# Patient Record
Sex: FEMALE | Race: BLACK OR AFRICAN AMERICAN | NOT HISPANIC OR LATINO | Employment: UNEMPLOYED | URBAN - METROPOLITAN AREA
[De-identification: names, ages, dates, MRNs, and addresses within clinical notes are randomized per-mention and may not be internally consistent; named-entity substitution may affect disease eponyms.]

---

## 2017-01-13 ENCOUNTER — RECORDS - HEALTHEAST (OUTPATIENT)
Dept: LAB | Facility: CLINIC | Age: 47
End: 2017-01-13

## 2017-01-13 LAB
CHOLEST SERPL-MCNC: 188 MG/DL
FASTING STATUS PATIENT QL REPORTED: NORMAL
HDLC SERPL-MCNC: 51 MG/DL
LDLC SERPL CALC-MCNC: 121 MG/DL
TRIGL SERPL-MCNC: 78 MG/DL

## 2018-07-31 ENCOUNTER — RECORDS - HEALTHEAST (OUTPATIENT)
Dept: LAB | Facility: CLINIC | Age: 48
End: 2018-07-31

## 2018-07-31 ENCOUNTER — RECORDS - HEALTHEAST (OUTPATIENT)
Dept: ADMINISTRATIVE | Facility: OTHER | Age: 48
End: 2018-07-31

## 2018-07-31 LAB
ANION GAP SERPL CALCULATED.3IONS-SCNC: 10 MMOL/L (ref 5–18)
BUN SERPL-MCNC: 12 MG/DL (ref 8–22)
CALCIUM SERPL-MCNC: 9.3 MG/DL (ref 8.5–10.5)
CHLORIDE BLD-SCNC: 103 MMOL/L (ref 98–107)
CHOLEST SERPL-MCNC: 205 MG/DL
CO2 SERPL-SCNC: 28 MMOL/L (ref 22–31)
CREAT SERPL-MCNC: 0.64 MG/DL (ref 0.6–1.1)
FASTING STATUS PATIENT QL REPORTED: ABNORMAL
GFR SERPL CREATININE-BSD FRML MDRD: >60 ML/MIN/1.73M2
GLUCOSE BLD-MCNC: 71 MG/DL (ref 70–125)
HDLC SERPL-MCNC: 58 MG/DL
LDLC SERPL CALC-MCNC: 133 MG/DL
POTASSIUM BLD-SCNC: 3.8 MMOL/L (ref 3.5–5)
SODIUM SERPL-SCNC: 141 MMOL/L (ref 136–145)
TRIGL SERPL-MCNC: 71 MG/DL

## 2018-08-01 LAB — 25(OH)D3 SERPL-MCNC: 19.1 NG/ML (ref 30–80)

## 2022-09-30 ENCOUNTER — LAB REQUISITION (OUTPATIENT)
Dept: LAB | Facility: CLINIC | Age: 52
End: 2022-09-30

## 2022-09-30 DIAGNOSIS — Z13.220 ENCOUNTER FOR SCREENING FOR LIPOID DISORDERS: ICD-10-CM

## 2022-09-30 DIAGNOSIS — Z13.1 ENCOUNTER FOR SCREENING FOR DIABETES MELLITUS: ICD-10-CM

## 2022-09-30 LAB
ANION GAP SERPL CALCULATED.3IONS-SCNC: 13 MMOL/L (ref 7–15)
BUN SERPL-MCNC: 12.9 MG/DL (ref 6–20)
CALCIUM SERPL-MCNC: 8.7 MG/DL (ref 8.6–10)
CHLORIDE SERPL-SCNC: 99 MMOL/L (ref 98–107)
CHOLEST SERPL-MCNC: 221 MG/DL
CREAT SERPL-MCNC: 0.57 MG/DL (ref 0.51–0.95)
DEPRECATED HCO3 PLAS-SCNC: 27 MMOL/L (ref 22–29)
GFR SERPL CREATININE-BSD FRML MDRD: >90 ML/MIN/1.73M2
GLUCOSE SERPL-MCNC: 97 MG/DL (ref 70–99)
HDLC SERPL-MCNC: 54 MG/DL
LDLC SERPL CALC-MCNC: 146 MG/DL
NONHDLC SERPL-MCNC: 167 MG/DL
POTASSIUM SERPL-SCNC: 4.5 MMOL/L (ref 3.4–5.3)
SODIUM SERPL-SCNC: 139 MMOL/L (ref 136–145)
TRIGL SERPL-MCNC: 104 MG/DL

## 2022-09-30 PROCEDURE — 80061 LIPID PANEL: CPT | Performed by: PHYSICIAN ASSISTANT

## 2022-09-30 PROCEDURE — 80048 BASIC METABOLIC PNL TOTAL CA: CPT | Performed by: PHYSICIAN ASSISTANT

## 2023-10-16 ENCOUNTER — LAB (OUTPATIENT)
Dept: FAMILY MEDICINE | Facility: CLINIC | Age: 53
End: 2023-10-16

## 2023-10-16 ENCOUNTER — OFFICE VISIT (OUTPATIENT)
Dept: FAMILY MEDICINE | Facility: CLINIC | Age: 53
End: 2023-10-16
Payer: COMMERCIAL

## 2023-10-16 VITALS
TEMPERATURE: 98.4 F | DIASTOLIC BLOOD PRESSURE: 72 MMHG | RESPIRATION RATE: 16 BRPM | BODY MASS INDEX: 34.52 KG/M2 | HEART RATE: 73 BPM | SYSTOLIC BLOOD PRESSURE: 128 MMHG | WEIGHT: 202.2 LBS | HEIGHT: 64 IN | OXYGEN SATURATION: 98 %

## 2023-10-16 DIAGNOSIS — M54.41 CHRONIC BILATERAL LOW BACK PAIN WITH RIGHT-SIDED SCIATICA: ICD-10-CM

## 2023-10-16 DIAGNOSIS — R73.03 PREDIABETES: ICD-10-CM

## 2023-10-16 DIAGNOSIS — Z12.4 CERVICAL CANCER SCREENING: ICD-10-CM

## 2023-10-16 DIAGNOSIS — E53.8 VITAMIN B12 DEFICIENCY (NON ANEMIC): ICD-10-CM

## 2023-10-16 DIAGNOSIS — Z11.4 SCREENING FOR HIV (HUMAN IMMUNODEFICIENCY VIRUS): ICD-10-CM

## 2023-10-16 DIAGNOSIS — Z12.31 VISIT FOR SCREENING MAMMOGRAM: ICD-10-CM

## 2023-10-16 DIAGNOSIS — E55.9 VITAMIN D DEFICIENCY: ICD-10-CM

## 2023-10-16 DIAGNOSIS — Z00.00 ROUTINE GENERAL MEDICAL EXAMINATION AT A HEALTH CARE FACILITY: Primary | ICD-10-CM

## 2023-10-16 DIAGNOSIS — G89.29 CHRONIC BILATERAL LOW BACK PAIN WITH RIGHT-SIDED SCIATICA: ICD-10-CM

## 2023-10-16 DIAGNOSIS — Z11.59 NEED FOR HEPATITIS C SCREENING TEST: ICD-10-CM

## 2023-10-16 DIAGNOSIS — Z12.11 SCREEN FOR COLON CANCER: ICD-10-CM

## 2023-10-16 DIAGNOSIS — Z22.7 LATENT TUBERCULOSIS BY BLOOD TEST: ICD-10-CM

## 2023-10-16 DIAGNOSIS — R53.83 FATIGUE, UNSPECIFIED TYPE: ICD-10-CM

## 2023-10-16 LAB
ALBUMIN SERPL BCG-MCNC: 4.2 G/DL (ref 3.5–5.2)
ALP SERPL-CCNC: 59 U/L (ref 35–104)
ALT SERPL W P-5'-P-CCNC: 15 U/L (ref 0–50)
ANION GAP SERPL CALCULATED.3IONS-SCNC: 12 MMOL/L (ref 7–15)
AST SERPL W P-5'-P-CCNC: 21 U/L (ref 0–45)
BILIRUB SERPL-MCNC: 0.3 MG/DL
BUN SERPL-MCNC: 12.5 MG/DL (ref 6–20)
CALCIUM SERPL-MCNC: 9.2 MG/DL (ref 8.6–10)
CHLORIDE SERPL-SCNC: 102 MMOL/L (ref 98–107)
CHOLEST SERPL-MCNC: 195 MG/DL
CREAT SERPL-MCNC: 0.53 MG/DL (ref 0.51–0.95)
DEPRECATED HCO3 PLAS-SCNC: 25 MMOL/L (ref 22–29)
EGFRCR SERPLBLD CKD-EPI 2021: >90 ML/MIN/1.73M2
ERYTHROCYTE [DISTWIDTH] IN BLOOD BY AUTOMATED COUNT: 13.1 % (ref 10–15)
GLUCOSE SERPL-MCNC: 92 MG/DL (ref 70–99)
HBA1C MFR BLD: 6.3 % (ref 0–5.6)
HCT VFR BLD AUTO: 39.2 % (ref 35–47)
HDLC SERPL-MCNC: 47 MG/DL
HGB BLD-MCNC: 11.9 G/DL (ref 11.7–15.7)
LDLC SERPL CALC-MCNC: 126 MG/DL
MCH RBC QN AUTO: 26.5 PG (ref 26.5–33)
MCHC RBC AUTO-ENTMCNC: 30.4 G/DL (ref 31.5–36.5)
MCV RBC AUTO: 87 FL (ref 78–100)
NONHDLC SERPL-MCNC: 148 MG/DL
PLATELET # BLD AUTO: 293 10E3/UL (ref 150–450)
POTASSIUM SERPL-SCNC: 3.9 MMOL/L (ref 3.4–5.3)
PROT SERPL-MCNC: 7.4 G/DL (ref 6.4–8.3)
RBC # BLD AUTO: 4.49 10E6/UL (ref 3.8–5.2)
SODIUM SERPL-SCNC: 139 MMOL/L (ref 135–145)
TRIGL SERPL-MCNC: 109 MG/DL
TSH SERPL DL<=0.005 MIU/L-ACNC: 0.64 UIU/ML (ref 0.3–4.2)
VIT B12 SERPL-MCNC: 688 PG/ML (ref 232–1245)
VIT D+METAB SERPL-MCNC: 27 NG/ML (ref 20–50)
WBC # BLD AUTO: 6.5 10E3/UL (ref 4–11)

## 2023-10-16 PROCEDURE — 99386 PREV VISIT NEW AGE 40-64: CPT

## 2023-10-16 PROCEDURE — 85027 COMPLETE CBC AUTOMATED: CPT

## 2023-10-16 PROCEDURE — 80053 COMPREHEN METABOLIC PANEL: CPT

## 2023-10-16 PROCEDURE — 86803 HEPATITIS C AB TEST: CPT

## 2023-10-16 PROCEDURE — 80061 LIPID PANEL: CPT

## 2023-10-16 PROCEDURE — 83036 HEMOGLOBIN GLYCOSYLATED A1C: CPT

## 2023-10-16 PROCEDURE — 36415 COLL VENOUS BLD VENIPUNCTURE: CPT

## 2023-10-16 PROCEDURE — 82306 VITAMIN D 25 HYDROXY: CPT

## 2023-10-16 PROCEDURE — 84443 ASSAY THYROID STIM HORMONE: CPT

## 2023-10-16 PROCEDURE — 82607 VITAMIN B-12: CPT

## 2023-10-16 PROCEDURE — 87389 HIV-1 AG W/HIV-1&-2 AB AG IA: CPT

## 2023-10-16 PROCEDURE — 99203 OFFICE O/P NEW LOW 30 MIN: CPT | Mod: 25

## 2023-10-16 RX ORDER — ISONIAZID 300 MG/1
300 TABLET ORAL DAILY
Qty: 90 TABLET | Refills: 0 | Status: SHIPPED | OUTPATIENT
Start: 2023-10-16 | End: 2024-01-14

## 2023-10-16 RX ORDER — TIZANIDINE 2 MG/1
2 TABLET ORAL 2 TIMES DAILY PRN
Qty: 14 TABLET | Refills: 0 | Status: SHIPPED | OUTPATIENT
Start: 2023-10-16

## 2023-10-16 RX ORDER — RIFAMPIN 300 MG/1
600 CAPSULE ORAL DAILY
Qty: 180 CAPSULE | Refills: 0 | Status: SHIPPED | OUTPATIENT
Start: 2023-10-16 | End: 2024-01-14

## 2023-10-16 ASSESSMENT — ENCOUNTER SYMPTOMS
WEAKNESS: 1
NAUSEA: 0
BREAST MASS: 0
DIZZINESS: 0
SHORTNESS OF BREATH: 0
ABDOMINAL PAIN: 0
DIARRHEA: 0
HEMATOCHEZIA: 0
HEARTBURN: 0
PALPITATIONS: 0
FEVER: 0
HEADACHES: 0
CHILLS: 0
JOINT SWELLING: 0
HEMATURIA: 0
NERVOUS/ANXIOUS: 0
PARESTHESIAS: 0
CONSTIPATION: 0
SORE THROAT: 0
EYE PAIN: 0
ARTHRALGIAS: 1
FREQUENCY: 0
MYALGIAS: 0
DYSURIA: 0
COUGH: 0

## 2023-10-16 NOTE — LETTER
"October 24, 2023      Brisa Wilcox [facility]   [place] [address]        Dear ,    We are writing to inform you of your test results.    Brisa,  Your blood counts, electrolytes, kidney function, and liver function labs are all normal.    Your hemoglobin A1c (test for diabetes) is elevated, but you are still in the prediabetes range. We will continue to monitor this. Modifications to your diet and increasing exercise will help.    Cholesterol is elevated and your \"good\" cholesterol is a little low. Work on eating healthy fats (olive oil, avocados, fish) and increasing fiber. Foods like vegetables, fruit, omega-3 fatty acids and organic, lean proteins are important parts of a healthy diet. The more colorful your food is, the healthier it is for you.     Exercise will help with this also. It is recommended that adults exercise at least 150 minutes/week at continuous 10 minutes or more intervals of moderate physical activity. Consider including cardiovascular exercise (walking, running, dancing, etc.), resistance training (use of weights, resistance bands, body weight, etc.) and stretching into your exercise routine.    Please let me know if you have any questions. Take care, DAKOTAH Mendoza CNP          Resulted Orders   HIV Antigen Antibody Combo   Result Value Ref Range    HIV Antigen Antibody Combo Nonreactive Nonreactive      Comment:      HIV-1 p24 Ag & HIV-1/HIV-2 Ab Not Detected   Hepatitis C Screen Reflex to HCV RNA Quant and Genotype   Result Value Ref Range    Hepatitis C Antibody Nonreactive Nonreactive    Narrative    Assay performance characteristics have not been established for newborns, infants, and children.   TSH with free T4 reflex   Result Value Ref Range    TSH 0.64 0.30 - 4.20 uIU/mL   CBC with platelets   Result Value Ref Range    WBC Count 6.5 4.0 - 11.0 10e3/uL    RBC Count 4.49 3.80 - 5.20 10e6/uL    Hemoglobin 11.9 11.7 - 15.7 g/dL    Hematocrit 39.2 35.0 - " 47.0 %    MCV 87 78 - 100 fL    MCH 26.5 26.5 - 33.0 pg    MCHC 30.4 (L) 31.5 - 36.5 g/dL    RDW 13.1 10.0 - 15.0 %    Platelet Count 293 150 - 450 10e3/uL   Comprehensive metabolic panel (BMP + Alb, Alk Phos, ALT, AST, Total. Bili, TP)   Result Value Ref Range    Sodium 139 135 - 145 mmol/L      Comment:      Reference intervals for this test were updated on 09/26/2023 to more accurately reflect our healthy population. There may be differences in the flagging of prior results with similar values performed with this method. Interpretation of those prior results can be made in the context of the updated reference intervals.     Potassium 3.9 3.4 - 5.3 mmol/L    Carbon Dioxide (CO2) 25 22 - 29 mmol/L    Anion Gap 12 7 - 15 mmol/L    Urea Nitrogen 12.5 6.0 - 20.0 mg/dL    Creatinine 0.53 0.51 - 0.95 mg/dL    GFR Estimate >90 >60 mL/min/1.73m2    Calcium 9.2 8.6 - 10.0 mg/dL    Chloride 102 98 - 107 mmol/L    Glucose 92 70 - 99 mg/dL    Alkaline Phosphatase 59 35 - 104 U/L    AST 21 0 - 45 U/L      Comment:      Reference intervals for this test were updated on 6/12/2023 to more accurately reflect our healthy population. There may be differences in the flagging of prior results with similar values performed with this method. Interpretation of those prior results can be made in the context of the updated reference intervals.    ALT 15 0 - 50 U/L      Comment:      Reference intervals for this test were updated on 6/12/2023 to more accurately reflect our healthy population. There may be differences in the flagging of prior results with similar values performed with this method. Interpretation of those prior results can be made in the context of the updated reference intervals.      Protein Total 7.4 6.4 - 8.3 g/dL    Albumin 4.2 3.5 - 5.2 g/dL    Bilirubin Total 0.3 <=1.2 mg/dL   Hemoglobin A1c   Result Value Ref Range    Hemoglobin A1C 6.3 (H) 0.0 - 5.6 %      Comment:      Normal <5.7%   Prediabetes 5.7-6.4%    Diabetes  6.5% or higher     Note: Adopted from ADA consensus guidelines.   Lipid panel reflex to direct LDL Fasting   Result Value Ref Range    Cholesterol 195 <200 mg/dL    Triglycerides 109 <150 mg/dL    Direct Measure HDL 47 (L) >=50 mg/dL    LDL Cholesterol Calculated 126 (H) <=100 mg/dL    Non HDL Cholesterol 148 (H) <130 mg/dL    Narrative    Cholesterol  Desirable:  <200 mg/dL    Triglycerides  Normal:  Less than 150 mg/dL  Borderline High:  150-199 mg/dL  High:  200-499 mg/dL  Very High:  Greater than or equal to 500 mg/dL    Direct Measure HDL  Female:  Greater than or equal to 50 mg/dL   Male:  Greater than or equal to 40 mg/dL    LDL Cholesterol  Desirable:  <100mg/dL  Above Desirable:  100-129 mg/dL   Borderline High:  130-159 mg/dL   High:  160-189 mg/dL   Very High:  >= 190 mg/dL    Non HDL Cholesterol  Desirable:  130 mg/dL  Above Desirable:  130-159 mg/dL  Borderline High:  160-189 mg/dL  High:  190-219 mg/dL  Very High:  Greater than or equal to 220 mg/dL   Vitamin D Deficiency   Result Value Ref Range    Vitamin D, Total (25-Hydroxy) 27 20 - 50 ng/mL      Comment:      optimum levels    Narrative    Season, race, dietary intake, and treatment affect the concentration of 25-hydroxy-Vitamin D. Values may decrease during winter months and increase during summer months.    Vitamin D determination is routinely performed by an immunoassay specific for 25 hydroxyvitamin D3.  If an individual is on vitamin D2(ergocalciferol) supplementation, please specify 25 OH vitamin D2 and D3 level determination by LCMSMS test VITD23.     Vitamin B12   Result Value Ref Range    Vitamin B12 688 232 - 1,245 pg/mL       If you have any questions or concerns, please call the clinic at the number listed above.       Sincerely,      DAKOTAH Mendoza CNP

## 2023-10-16 NOTE — PROGRESS NOTES
Assessment & Plan     1. Routine general medical examination at a health care facility  Preventative exam completed today. Discussed healthy lifestyle recommendations of getting 150 minutes of exercise weekly and eating a healthy diet. Reviewed and updated health maintenance. Labs completed today. Declined immunizations.   - REVIEW OF HEALTH MAINTENANCE PROTOCOL ORDERS  - CBC with platelets  - Comprehensive metabolic panel (BMP + Alb, Alk Phos, ALT, AST, Total. Bili, TP)  - Lipid panel reflex to direct LDL Fasting    2. Chronic bilateral low back pain with right-sided sciatica  Straight leg raises negative. No tenderness over spine. Will try tizanidine 1-2 times daily as needed and PT referral.   - Physical Therapy Referral; Future  - tiZANidine (ZANAFLEX) 2 MG tablet; Take 1 tablet (2 mg) by mouth 2 times daily as needed for muscle spasms  Dispense: 14 tablet; Refill: 0    3. Latent tuberculosis by blood test  Previously declined treatment and would not like to complete. Had chest xray completed that was negative for active disease. No cough, fever, shortness of breath. 3 month course given. Counseled on medications.   - rifampin (RIFADIN) 300 MG capsule; Take 2 capsules (600 mg) by mouth daily for 90 days  Dispense: 180 capsule; Refill: 0  - isoniazid (NYDRAZID) 300 MG tablet; Take 1 tablet (300 mg) by mouth daily for 90 days  Dispense: 90 tablet; Refill: 0    4. Fatigue, unspecified type  Low energy. Will check labs today.   - TSH with free T4 reflex  - CBC with platelets  - Comprehensive metabolic panel (BMP + Alb, Alk Phos, ALT, AST, Total. Bili, TP)    5. Prediabetes  Blood glucose machine broke. Had bee checking her blood sugar once daily and PRN. Last A1c 6.1. Recheck today.   - blood glucose monitoring (NO BRAND SPECIFIED) meter device kit; Use to test blood sugar 1 times daily or as directed.  Dispense: 1 kit; Refill: 0  - Comprehensive metabolic panel (BMP + Alb, Alk Phos, ALT, AST, Total. Bili, TP)  -  Hemoglobin A1c    6. Visit for screening mammogram  - MA SCREENING DIGITAL BILAT - Future  (s+30); Future    7. Screen for colon cancer  - COLOGUARD(EXACT SCIENCES); Future    8. Screening for HIV (human immunodeficiency virus)  - HIV Antigen Antibody Combo    9. Need for hepatitis C screening test  - Hepatitis C Screen Reflex to HCV RNA Quant and Genotype    10. Vitamin D deficiency  - Vitamin D Deficiency    11. Vitamin B12 deficiency (non anemic)  - Vitamin B12    12. Cervical cancer screening  Reports have 3 years ago, no records. She declines today.      DAKOTAH Mendoza CNP  [facility]    Subjective   Brisa is a 53 year old, presenting for the following health issues:  Physical and Recheck Medication (Pt reports that she is here for her physical, and also feels pain that starts from rt side low back that radiates down to her rt foot. Pt also reports feeling, no energy.)      10/16/2023    12:56 PM   Additional Questions   Roomed by kanika   Accompanied by alone         10/16/2023    12:56 PM   Patient Reported Additional Medications   Patient reports taking the following new medications none       Healthy Habits:     Getting at least 3 servings of Calcium per day:  Yes    Bi-annual eye exam:  NO    Dental care twice a year:  Yes    Sleep apnea or symptoms of sleep apnea:  None    Diet:  Regular (no restrictions)    Frequency of exercise:  None    Taking medications regularly:  Yes    Medication side effects:  None    Additional concerns today:  No       Pt reports that she is here for her physical, and feeling pain of her rt low back down to her rt foot, low energy, sweating a lot, weak, tired.    Right leg bothering, shooting down leg, worse with sitting and lying down. Taken robaxin and lido patch ineffective. Feels better when she is up walking. Does not affect her sleep.     Feeling tired/low energy, no energy to clean even though she knows she needs to. Skin feels dry. No  "temperature intolerances, weight changes.     History of prediabetes. Was checking her blood sugar once daily. Blood gluocose machine broke, needs replacement.     Would like treatment for latent TB. Had positive quantiferon gold test and negative chest xray. Was referred for treatment two years ago and decided to wait but now she would like to complete it.     Pap a few years ago.    Would like to be checked for vitamin deficiencies.       Review of Systems   Constitutional:  Negative for chills and fever.   HENT:  Negative for congestion, ear pain, hearing loss and sore throat.    Eyes:  Negative for pain and visual disturbance.   Respiratory:  Negative for cough and shortness of breath.    Cardiovascular:  Negative for chest pain, palpitations and peripheral edema.   Gastrointestinal:  Negative for abdominal pain, constipation, diarrhea, heartburn, hematochezia and nausea.   Breasts:  Negative for tenderness, breast mass and discharge.   Genitourinary:  Negative for dysuria, frequency, genital sores, hematuria, pelvic pain, urgency, vaginal bleeding and vaginal discharge.   Musculoskeletal:  Positive for arthralgias. Negative for joint swelling and myalgias.   Skin:  Negative for rash.   Neurological:  Positive for weakness. Negative for dizziness, headaches and paresthesias.   Psychiatric/Behavioral:  Negative for mood changes. The patient is not nervous/anxious.            Objective    /72 (BP Location: Left arm, Patient Position: Sitting, Cuff Size: Adult Regular)   Pulse 73   Temp 98.4  F (36.9  C) (Tympanic)   Resp 16   Ht 1.613 m (5' 3.5\")   Wt 91.7 kg (202 lb 3.2 oz)   LMP  (LMP Unknown)   SpO2 98%   Breastfeeding No   BMI 35.26 kg/m    Body mass index is 35.26 kg/m .    Physical Exam   GENERAL: healthy, alert and no distress  EYES: Eyes grossly normal to inspection, PERRL and conjunctivae and sclerae normal  HENT: ear canals and TM's normal, nose and mouth without ulcers or lesions  NECK: " no adenopathy, no asymmetry, masses, or scars and thyroid normal to palpation  RESP: lungs clear to auscultation - no rales, rhonchi or wheezes  CV: regular rate and rhythm, normal S1 S2, no S3 or S4, no murmur, click or rub, no peripheral edema and peripheral pulses strong  ABDOMEN: soft, nontender, no hepatosplenomegaly, no masses and bowel sounds normal  MS: no gross musculoskeletal defects noted, no edema. No paralumbar tenderness, negative straight leg raises.   SKIN: no suspicious lesions or rashes  NEURO: Normal strength and tone, mentation intact and speech normal  PSYCH: mentation appears normal, affect normal/bright

## 2023-10-17 LAB
HCV AB SERPL QL IA: NONREACTIVE
HIV 1+2 AB+HIV1 P24 AG SERPL QL IA: NONREACTIVE

## 2023-10-23 ENCOUNTER — THERAPY VISIT (OUTPATIENT)
Dept: PHYSICAL THERAPY | Facility: REHABILITATION | Age: 53
End: 2023-10-23
Payer: COMMERCIAL

## 2023-10-23 DIAGNOSIS — G89.29 CHRONIC BILATERAL LOW BACK PAIN WITH RIGHT-SIDED SCIATICA: ICD-10-CM

## 2023-10-23 DIAGNOSIS — M54.41 CHRONIC BILATERAL LOW BACK PAIN WITH RIGHT-SIDED SCIATICA: ICD-10-CM

## 2023-10-23 PROCEDURE — 97161 PT EVAL LOW COMPLEX 20 MIN: CPT | Mod: GP

## 2023-10-23 PROCEDURE — 97110 THERAPEUTIC EXERCISES: CPT | Mod: GP

## 2023-10-23 NOTE — PROGRESS NOTES
PHYSICAL THERAPY EVALUATION  Type of Visit: Evaluation    See electronic medical record for Abuse and Falls Screening details.    Subjective       Presenting condition or subjective complaint: back and leg pain  Pt arrives with chronic low back pain and had radiating leg pain on BLE with R greater than L. Radiating pain down to the ankle and foot. 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 Has had persistent pain causing pt to limp with walking, difficulty rising from sitting, feels more urgency incontenence correlating with R leg pain. Also feels like muscles on back of leg are tight.   Date of onset: 10/16/23    Relevant medical history:     Dates & types of surgery:      Prior diagnostic imaging/testing results:       Prior therapy history for the same diagnosis, illness or injury: No      Prior Level of Function  Transfers: Independent  Ambulation: Independent  ADL: Independent  IADL:  ind    Living Environment  Social support: With family members   Type of home:     Stairs to enter the home: Yes   Is there a railing: Yes   Ramp: No   Stairs inside the home: No   Is there a railing: No   Help at home: None  Equipment owned:       Employment: Yes Car rental  Hobbies/Interests:      Patient goals for therapy: limits walking and bathroom    Pain assessment: Pain present     Objective   LUMBAR SPINE EVALUATION  PAIN: Pain Level at Rest: 6/10  Pain Level with Use: 10/10  Pain Location: Low back and B legs with R>L  Pain Quality: Burning, Numb, Sharp, and Tiring  Pain Frequency: constant or constant low back and intermittent radiating leg pain   Pain is Worst: activity dependent   Pain is Exacerbated By: walking, standing, rising from sitting   Pain is Relieved By: massage and oil, topical creme, occasional pain relief medication  Pain Progression: Worsened  INTEGUMENTARY (edema, incisions):   POSTURE: WFL  GAIT:   Weightbearing Status: WBAT  Assistive Device(s): None  Gait Deviations:  Antalgic  BALANCE/PROPRIOCEPTION:   WEIGHTBEARING ALIGNMENT: WNL  NON-WEIGHTBEARING ALIGNMENT: WNL   ROM:   (Degrees) Left AROM Left PROM  Right AROM Right PROM   Hip Flexion       Hip Extension       Hip Abduction       Hip Adduction       Hip Internal Rotation       Hip External Rotation       Knee Flexion       Knee Extension       Lumbar Side glide WFL, painful back     Lumbar Flexion Min loss, painful    Lumbar Extension Min loss, painful    Pain: Hip ROM WNL B, increased pain with IR on RLE and low back   End feel: Normal   PELVIC/SI SCREEN:   STRENGTH: WFL    MYOTOMES:  4+ all MMT BLE, hip and knee flexion limited by tenderness on RLE  DTR S:   CORD SIGNS:   DERMATOMES: WNL  NEURAL TENSION:   FLEXIBILITY:  Limited HS flexibility   LUMBAR/HIP Special Tests:    Left Right   REJI Negative  Negative    FADIR/Labrum/HARRY Negative  Negative    Femoral Nerve     Aly's     Piriformis Negative  Negative    Quadrant Testing     SLR Negative  Positive   Slump Negative  Positive   Stork with Extension     Masoud             PELVIS/SI SPECIAL TESTS:   FUNCTIONAL TESTS:  Pain with STS, decreased RLE weightbearing  PALPATION:  TTP at low back pain and R glute and hamstring   SPINAL SEGMENTAL CONCLUSIONS:  [facility]       Assessment & Plan   CLINICAL IMPRESSIONS  Medical Diagnosis: M54.41, G89.29 (ICD-10-CM) - Chronic bilateral low back pain with right-sided sciatica    Treatment Diagnosis: Low back pain   Impression/Assessment: Patient is a 53 year old female with low back pain complaints.  The following significant findings have been identified: Pain, Decreased ROM/flexibility, Decreased joint mobility, Decreased strength, Impaired gait, Impaired muscle performance, Decreased activity tolerance, Impaired posture, and Instability. These impairments interfere with their ability to perform self care tasks, work tasks, recreational activities, household chores, driving , household mobility, and community mobility as compared to  previous level of function. Pt presents with chronic low back pain and B LE radiating leg pain with RLE >L. Pain persistent and preventing walking with typical gait pattern. Upon eval findings most sugesstive of incrased R sided nerve tension with + SLR and slump test. Pt would beneift from skilled PT to improve funciton.     Clinical Decision Making (Complexity):  Clinical Presentation: Stable/Uncomplicated  Clinical Presentation Rationale: based on medical and personal factors listed in PT evaluation  Clinical Decision Making (Complexity): Low complexity    PLAN OF CARE  Treatment Interventions:  Modalities: E-stim, Iontophoresis, Mechanical Traction, Ultrasound  Interventions: Gait Training, Manual Therapy, Neuromuscular Re-education, Therapeutic Activity, Therapeutic Exercise, Self-Care/Home Management    Long Term Goals     PT Goal 1  Goal Identifier: HEP  Goal Description: Patient will be independent in self-management of condition and HEP to reach functional goals.  Target Date: 01/15/24  PT Goal 2  Goal Identifier: Walking  Goal Description: Pt will be able to walk for 30+ minutes with pain level <3/10 for return to physical activity  Target Date: 01/15/24  PT Goal 3  Goal Identifier: Sitting  Goal Description: Pt will be able to sit for 1+ hours and get up with pain level <3/10 to sit and eat meals  Target Date: 01/15/24  PT Goal 4  Goal Identifier: Bending  Goal Description: Pt will be able to bend and  item off ground with pain level <3/10 to do ADLs  Target Date: 01/15/24      Frequency of Treatment: 1x/wk  Duration of Treatment: 12 weeks    Recommended Referrals to Other Professionals:   Education Assessment:   Learner/Method: Patient  Education Comments: Verbalizes understanding    Risks and benefits of evaluation/treatment have been explained.   Patient/Family/caregiver agrees with Plan of Care.     Evaluation Time:     PT Bradley Low Complexity Minutes (37258): 20       Signing Clinician:  BETO GRIFFIN, PT      [facility]                                                                                   OUTPATIENT PHYSICAL THERAPY      PLAN OF TREATMENT FOR OUTPATIENT REHABILITATION   Patient's Last Name, First Name, Brisa Martinez YOB: 1970   Provider's Name   [facility]   Medical Record No.  [id number]     Onset Date: 10/16/23  Start of Care Date: 10/23/23     Medical Diagnosis:  M54.41, G89.29 (ICD-10-CM) - Chronic bilateral low back pain with right-sided sciatica      PT Treatment Diagnosis:  Low back pain Plan of Treatment  Frequency/Duration: 1x/wk/ 12 weeks    Certification date from 10/23/23 to 01/15/24         See note for plan of treatment details and functional goals     BETO GRIFFIN, PT                         I CERTIFY THE NEED FOR THESE SERVICES FURNISHED UNDER        THIS PLAN OF TREATMENT AND WHILE UNDER MY CARE     (Physician attestation of this document indicates review and certification of the therapy plan).                Referring Provider:  Ana Lopes      Initial Assessment  See Epic Evaluation- Start of Care Date: 10/23/23

## 2023-11-28 NOTE — PROGRESS NOTES
DISCHARGE  Reason for Discharge: Patient has failed to schedule further appointments.    Equipment Issued: none    Discharge Plan: Patient to continue home program.    Referring Provider:  Ana Lopes     10/23/23 0500   Appointment Info   Signing clinician's name / credentials Anna Dumont, PT, DPT   Total/Authorized Visits 12   Visits Used 1   Medical Diagnosis M54.41, G89.29 (ICD-10-CM) - Chronic bilateral low back pain with right-sided sciatica   PT Tx Diagnosis Low back pain   Quick Adds Certification   Progress Note/Certification   Start of Care Date 10/23/23   Onset of illness/injury or Date of Surgery 10/16/23   Therapy Frequency 1x/wk   Predicted Duration 12 weeks   Certification date from 10/23/23   Certification date to 01/15/24   GOALS   PT Goals 2;3;4   PT Goal 1   Goal Identifier HEP   Goal Description Patient will be independent in self-management of condition and HEP to reach functional goals.   Target Date 01/15/24   PT Goal 2   Goal Identifier Walking   Goal Description Pt will be able to walk for 30+ minutes with pain level <3/10 for return to physical activity   Target Date 01/15/24   PT Goal 3   Goal Identifier Sitting   Goal Description Pt will be able to sit for 1+ hours and get up with pain level <3/10 to sit and eat meals   Target Date 01/15/24   PT Goal 4   Goal Identifier Bending   Goal Description Pt will be able to bend and  item off ground with pain level <3/10 to do ADLs   Target Date 01/15/24   Subjective Report   Subjective Report Pt arrives with chronic low back pain and had radiating leg pain on BLE with R greater than L. Radiating pain down to the ankle and foot. 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 Has had persistent pain causing pt to limp with walking, difficulty rising from sitting, feels more urgency incontenence correlating with R leg pain. Also feels like muscles on back of leg are tight.   Objective Measures   Objective Measures  Objective Measure 1;Objective Measure 2;Objective Measure 3   Treatment Interventions (PT)   Interventions Therapeutic Procedure/Exercise;Manual Therapy   Therapeutic Procedure/Exercise   Therapeutic Procedures: strength, endurance, ROM, flexibillity minutes (08500) 25   Ther Proc 1 - Details Time spent educating pt on benefit of physical activity and exercise and active approach vs only passive modalities such as creams and massage. Discussed anatomy of spine with use of model and discussed nerve health and distal leg pain. Instructed in HEP - seated and supine glute stretch 30'', HS stretch 30'' B, seated sciatic nerve glides x 12. Time spent discussing leg length - confirmed with pt that legs are in alignment and same length.Added HEP to homescreen.   Skilled Intervention Updated HEP, Patient education, Verbal and tactile cues utilized to facilitate correct exercise technique   Patient Response/Progress Tolerated well, verbalizes understanding   Manual Therapy   Skilled Intervention STM, joint mobilizations   Eval/Assessments   PT Eval, Low Complexity Minutes (75531) 20   Education   Learner/Method Patient   Education Comments Verbalizes understanding   Plan   Home program PTRx (phone) - sciatic nerve, HS, glute stretch   Plan for next session Review HEP - glute and back strength   Comments   Comments Pt presents with chronic low back pain and B LE radiating leg pain with RLE >L. Pain persistent and preventing walking with typical gait pattern. Upon eval findings most sugesstive of incrased R sided nerve tension with + SLR and slump test. Pt would beneift from skilled PT to improve funciton.   Total Session Time   Timed Code Treatment Minutes 25   Total Treatment Time (sum of timed and untimed services) 45     BETO GRIFFIN, PT

## 2024-01-24 DIAGNOSIS — R73.03 PREDIABETES: ICD-10-CM

## 2024-02-01 ENCOUNTER — TELEPHONE (OUTPATIENT)
Dept: FAMILY MEDICINE | Facility: CLINIC | Age: 54
End: 2024-02-01
Payer: COMMERCIAL

## 2024-02-01 DIAGNOSIS — R73.03 PREDIABETES: Primary | ICD-10-CM

## 2024-02-01 NOTE — TELEPHONE ENCOUNTER
New Medication Request    Contacts         Type Contact Phone/Fax    02/01/2024 03:50 PM CST Fax (Incoming) [id number] - [place], [place] - [address] [facility] (Pharmacy) [phone]            What medication are you requesting?: PHARMACY    Reason for medication request: looking for script for strips and lancets please    H    Controlled Substance Agreement on file:   CSA -- Patient Level:    CSA: None found at the patient level.           Preferred Pharmacy:  [id number] - [place], [place] - [address] [place] [address]  Phone: [phone] Fax: [phone]